# Patient Record
Sex: MALE | Race: WHITE | Employment: FULL TIME | URBAN - METROPOLITAN AREA
[De-identification: names, ages, dates, MRNs, and addresses within clinical notes are randomized per-mention and may not be internally consistent; named-entity substitution may affect disease eponyms.]

---

## 2017-05-16 ENCOUNTER — HOSPITAL ENCOUNTER (OUTPATIENT)
Age: 37
Discharge: HOME | End: 2017-05-16
Attending: FAMILY MEDICINE
Payer: COMMERCIAL

## 2017-05-16 VITALS
DIASTOLIC BLOOD PRESSURE: 96 MMHG | OXYGEN SATURATION: 97 % | SYSTOLIC BLOOD PRESSURE: 141 MMHG | WEIGHT: 170 LBS | TEMPERATURE: 98 F | HEIGHT: 66 IN | RESPIRATION RATE: 12 BRPM | BODY MASS INDEX: 27.32 KG/M2 | HEART RATE: 74 BPM

## 2017-05-16 DIAGNOSIS — S61.219A FINGER LACERATION, INITIAL ENCOUNTER: Primary | ICD-10-CM

## 2017-05-16 PROCEDURE — 99213 OFFICE O/P EST LOW 20 MIN: CPT

## 2017-05-16 PROCEDURE — 99203 OFFICE O/P NEW LOW 30 MIN: CPT

## 2017-05-16 PROCEDURE — 90471 IMMUNIZATION ADMIN: CPT

## 2017-05-16 PROCEDURE — 12001 RPR S/N/AX/GEN/TRNK 2.5CM/<: CPT

## 2017-05-17 NOTE — ED NOTES
I called the pt in response to his smart er. Pt had questions about his splint and sutures. Pt was instructed to wear the finger splint for 2 days.  Pt was instructed to keep his dressing intact for at least 1 day and when dressing was wet to change it imme

## 2017-05-17 NOTE — ED PROVIDER NOTES
Patient presents with:  Laceration Abrasion (integumentary)      HPI:     Phylicia Nieto is a 43year old male presents for a chief complaint of laceration evaluation and repair. Injury occurred  Onset today. Location:  left index finger.   The patient discharge instructions for your condition today. Follow Up with:  [facility] BARRY [facility], [place], operated by [facility]  [phone]  In 10 days

## 2017-05-25 ENCOUNTER — OFFICE VISIT (OUTPATIENT)
Dept: FAMILY MEDICINE CLINIC | Facility: CLINIC | Age: 37
End: 2017-05-25

## 2017-05-25 DIAGNOSIS — Z48.02 VISIT FOR SUTURE REMOVAL: Primary | ICD-10-CM

## 2017-05-25 PROCEDURE — 99024 POSTOP FOLLOW-UP VISIT: CPT | Performed by: PHYSICIAN ASSISTANT

## 2017-05-25 NOTE — PATIENT INSTRUCTIONS
Suture or Staple Removal  You were seen today for a suture or staple removal. Your wound is healing as expected. The wound has healed well enough that the sutures or staples can be removed. The wound will continue to heal for the next few months.   At [facility]

## 2017-05-25 NOTE — PROGRESS NOTES
CHIEF COMPLAINT:   Patient presents with:  Suture Removal: IC on 5/16/17 no issues with wound, sutures ready to come out      HPI:   Bruno Reilly is a 43year old male who presents for suture removal. Presenting for suture removal on left 2nd digit.  6 issues   HEENT:Head atraumatic, normocephalic  LUNGS: non labored breathing      ASSESSMENT:   Byron Churchill is a 43year old male who presented for evaluation of a dermatological issue.  Symptoms are consistent with:    Visit for suture removal  (primar

## (undated) NOTE — MR AVS SNAPSHOT
[address] [id number] [facility] [id number] [phone]               Thank you for choosing us for your [facility] visit [facility]   We are glad to serve you and happy to provide you with this summary of yo · You may shower and bathe as usual. Swimming is now permitted. Follow-up care  Follow up with your healthcare provider as advised.   When to seek medical advice   Call your healthcare provider if any of the following occur:  · Wound reopens or bleeds  · S your Zip Code and Date of Birth to complete the sign-up process. If you do not sign up before the expiration date, you must request a new code.     Your unique CH4[employer] Access Code: Amador Sánchez  Expires: 7/15/2017  8:45 PM    If you have questions, you can c